# Patient Record
Sex: MALE | Race: WHITE | NOT HISPANIC OR LATINO | Employment: FULL TIME | URBAN - METROPOLITAN AREA
[De-identification: names, ages, dates, MRNs, and addresses within clinical notes are randomized per-mention and may not be internally consistent; named-entity substitution may affect disease eponyms.]

---

## 2022-05-23 ENCOUNTER — OFFICE VISIT (OUTPATIENT)
Dept: ORTHOPEDIC SURGERY | Facility: CLINIC | Age: 57
End: 2022-05-23

## 2022-05-23 VITALS
HEIGHT: 70 IN | WEIGHT: 214.8 LBS | DIASTOLIC BLOOD PRESSURE: 72 MMHG | SYSTOLIC BLOOD PRESSURE: 128 MMHG | BODY MASS INDEX: 30.75 KG/M2

## 2022-05-23 DIAGNOSIS — I87.8 VENOUS STASIS: ICD-10-CM

## 2022-05-23 DIAGNOSIS — M25.571 PAIN IN JOINT INVOLVING RIGHT ANKLE AND FOOT: Primary | ICD-10-CM

## 2022-05-23 PROCEDURE — 99203 OFFICE O/P NEW LOW 30 MIN: CPT | Performed by: ORTHOPAEDIC SURGERY

## 2022-05-23 NOTE — PROGRESS NOTES
NEW PATIENT    Patient: Leno Grande  : 1965    Primary Care Provider: Sophie Musa MD    Requesting Provider: As above    Pain of the Right Foot and Pain of the Right Ankle      History right ankle pain    Chief Complaint: Right ankle pain    History of Present Illness: This is an extremely pleasant 56-year-old woman who is here today with his wife.  I saw him in 2017 and found he had quite significant right ankle arthritis.  He reports that he got orthotics as I recommended, and also compression socks and has done reasonably well.  He had an episode of significant increase in pain a number of weeks ago, that lasted about 2 weeks.  He reports he has been tested for gout and was found to be negative.  He is not aware of any family history of gout.  He reports the pain was 8-10 out of 10 and quite severe for 2 weeks and then gradually improved back to baseline.  He works on his feet all day on concrete.  He works as a .  He has not tried topical Voltaren gel and I suggested he might try that.  He would like a new prescription for orthotics.    No current outpatient medications on file prior to visit.     No current facility-administered medications on file prior to visit.      No Known Allergies   Past Medical History:   Diagnosis Date   • Arthritis      Past Surgical History:   Procedure Laterality Date   • APPENDECTOMY     • JOINT REPLACEMENT Right 2021    [facility] Dr. Liang   • KNEE ACL RECONSTRUCTION       Family History   Problem Relation Age of Onset   • Diabetes Mother    • Cancer Mother    • Arthritis Mother    • Heart disease Mother    • Hypertension Mother    • Arthritis Father    • Anemia Father    • Anemia Sister    • Asthma Sister    • Hypertension Maternal Grandmother    • Heart disease Maternal Grandmother    • Diabetes Maternal Grandmother    • Cancer Maternal Grandmother    • Arthritis Maternal Grandmother    • Hypertension Maternal Grandfather    • Heart  "disease Maternal Grandfather    • Diabetes Maternal Grandfather    • Cancer Maternal Grandfather    • Arthritis Maternal Grandfather    • Hypertension Paternal Grandmother    • Heart disease Paternal Grandmother    • Cancer Paternal Grandmother    • Arthritis Paternal Grandmother    • Hypertension Paternal Grandfather    • Heart disease Paternal Grandfather    • Cancer Paternal Grandfather    • Arthritis Paternal Grandfather    • Kidney disease Paternal Grandfather       Social History     Socioeconomic History   • Marital status:    Tobacco Use   • Smoking status: Never Smoker   • Smokeless tobacco: Never Used   Vaping Use   • Vaping Use: Never used   Substance and Sexual Activity   • Alcohol use: Yes     Comment: 2 drinks/week   • Drug use: Never   • Sexual activity: Defer        Review of Systems   Constitutional: Negative.    HENT: Negative.    Eyes: Negative.    Respiratory: Negative.    Cardiovascular: Negative.    Gastrointestinal: Negative.    Endocrine: Negative.    Genitourinary: Negative.    Musculoskeletal: Positive for arthralgias.   Skin: Negative.    Allergic/Immunologic: Negative.    Neurological: Negative.    Hematological: Negative.    Psychiatric/Behavioral: Negative.        The following portions of the patient's history were reviewed and updated as appropriate: allergies, current medications, past family history, past medical history, past social history, past surgical history and problem list.    Physical Exam:   /72   Ht 177.8 cm (70\")   Wt 97.4 kg (214 lb 12.8 oz)   BMI 30.82 kg/m²   GENERAL: Body habitus: overweight    Lower extremity edema: Right: trace; Left: trace    Varicose veins:  Right: mild; Left: mild    Gait: antalgic with the first few steps     Mental Status:  awake and alert; oriented to person, place, and time    Voice:  clear  SKIN:  Lower extremity: warm and dry    Hair Growth(lower extremity):  Right:normal; Left:  normal  NAILS: Toenails: " normal  HEENT: Head: Normocephalic, atraumatic,  without obvious abnormality.  eye: normal external eye, no icterus  ears:normal external ears  PULM:  Repiratory effort normal  CV:  Dorsalis Pedis:  Right: 2+; Left:2+    Posterior Tibial: Right:2+; Left:2+    Capillary Refill:  Brisk  MSK:  Hand:mild arthritis, Heberden's nodes      Tibia:  Right:  non tender; Left:  non tender      Ankle:  Right: Mildly tender across the anterior aspect of the ankle, ankle slightly thickened compared with right, 5 degrees dorsiflexion, 30 degrees plantarflexion, 5 degrees inversion eversion; Left:  non tender      Foot:  Right:  non tender; Left:  non tender      NEURO:      Garrison-Kiley 5.07 monofilament test: normal    Lower extremity sensation: intact         Calf Atrophy moderate right calf atrophy    Motor Function: all 5/5         Medical Decision Making    Data Review:   ordered and reviewed x-rays today    Assessment and Plan/ Diagnosis/Treatment options:   1. Pain in joint involving right ankle and foot  He does have severe right ankle arthritis.  He brought x-rays on a disc from 2016, and it has progressed somewhat from then.  We talked about arthritis, we talked about cartilage loss.  I explained we do not have any way to put cartilage back in the joint.  He has had a right knee replacement, and I explained that ankle replacements are just not as well worked out.  He does not think he is ready for surgery anyway.  We talked about bracing, we talked about injection.  He is not certain that he is ready for injection.  He would like new orthotics and I gave him a prescription.  We talked about topical Voltaren gel.  I will be happy to see him anytime  - XR Foot 2 View Right  - XR Ankle 2 View Right    2. Venous stasis  He should definitely continue to wear compression socks            Part of this encounter note is an electronic transcription/translation of spoken language to printed text. The electronic translation of  spoken language may permit erroneous, or at times, nonsensical words or phrases to be inadvertently transcribed; Although I have reviewed the note for such errors, some may still exist.          Elisa Enriquez MD